# Patient Record
Sex: MALE | Race: WHITE | NOT HISPANIC OR LATINO | URBAN - METROPOLITAN AREA
[De-identification: names, ages, dates, MRNs, and addresses within clinical notes are randomized per-mention and may not be internally consistent; named-entity substitution may affect disease eponyms.]

---
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
## 2021-04-21 NOTE — ED PROVIDER NOTE - PROGRESS NOTE DETAILS
ANTICOAGULATION     Robertdagoberto Richard is overdue for INR check.      Left message for patient to call and schedule lab appointment as soon as possible. If returning call, please schedule.     Chantell Boston RN       Pia Landers: Dr. Garnett updated pt on results of radiology studies and lab work. Spoke to Surgery regarding CT results.  Nothing to do, pain control.

## 2023-02-03 NOTE — ED PROVIDER NOTE - DISCHARGE DATE
For information on Fall & Injury Prevention, visit: [email or web address] OR  [email or web address] OR  https://www.cdc.gov/steadi/patient.html 26-May-2017